# Patient Record
Sex: OTHER/UNKNOWN | Race: WHITE | URBAN - METROPOLITAN AREA
[De-identification: names, ages, dates, MRNs, and addresses within clinical notes are randomized per-mention and may not be internally consistent; named-entity substitution may affect disease eponyms.]

---

## 2018-11-21 ENCOUNTER — OFFICE VISIT (OUTPATIENT)
Dept: INTERNAL MEDICINE | Facility: CLINIC | Age: 23
End: 2018-11-21
Payer: COMMERCIAL

## 2018-11-21 VITALS
TEMPERATURE: 97.8 F | RESPIRATION RATE: 20 BRPM | HEIGHT: 73 IN | BODY MASS INDEX: 29.03 KG/M2 | OXYGEN SATURATION: 98 % | WEIGHT: 219 LBS | HEART RATE: 61 BPM | DIASTOLIC BLOOD PRESSURE: 62 MMHG | SYSTOLIC BLOOD PRESSURE: 110 MMHG

## 2018-11-21 DIAGNOSIS — F41.1 GAD (GENERALIZED ANXIETY DISORDER): ICD-10-CM

## 2018-11-21 DIAGNOSIS — F33.1 MODERATE EPISODE OF RECURRENT MAJOR DEPRESSIVE DISORDER (H): ICD-10-CM

## 2018-11-21 DIAGNOSIS — Z23 NEED FOR PROPHYLACTIC VACCINATION AND INOCULATION AGAINST INFLUENZA: Primary | ICD-10-CM

## 2018-11-21 PROCEDURE — 90686 IIV4 VACC NO PRSV 0.5 ML IM: CPT | Performed by: PHYSICIAN ASSISTANT

## 2018-11-21 PROCEDURE — 90471 IMMUNIZATION ADMIN: CPT | Performed by: PHYSICIAN ASSISTANT

## 2018-11-21 PROCEDURE — 99203 OFFICE O/P NEW LOW 30 MIN: CPT | Mod: 25 | Performed by: PHYSICIAN ASSISTANT

## 2018-11-21 ASSESSMENT — ANXIETY QUESTIONNAIRES
5. BEING SO RESTLESS THAT IT IS HARD TO SIT STILL: SEVERAL DAYS
7. FEELING AFRAID AS IF SOMETHING AWFUL MIGHT HAPPEN: SEVERAL DAYS
2. NOT BEING ABLE TO STOP OR CONTROL WORRYING: NEARLY EVERY DAY
IF YOU CHECKED OFF ANY PROBLEMS ON THIS QUESTIONNAIRE, HOW DIFFICULT HAVE THESE PROBLEMS MADE IT FOR YOU TO DO YOUR WORK, TAKE CARE OF THINGS AT HOME, OR GET ALONG WITH OTHER PEOPLE: SOMEWHAT DIFFICULT
1. FEELING NERVOUS, ANXIOUS, OR ON EDGE: MORE THAN HALF THE DAYS
3. WORRYING TOO MUCH ABOUT DIFFERENT THINGS: MORE THAN HALF THE DAYS
GAD7 TOTAL SCORE: 10
6. BECOMING EASILY ANNOYED OR IRRITABLE: NOT AT ALL

## 2018-11-21 ASSESSMENT — PATIENT HEALTH QUESTIONNAIRE - PHQ9
SUM OF ALL RESPONSES TO PHQ QUESTIONS 1-9: 11
5. POOR APPETITE OR OVEREATING: SEVERAL DAYS

## 2018-11-21 NOTE — PROGRESS NOTES
"  SUBJECTIVE:   Gideon Sue is a 22 year old unknown who presents to clinic today for the following health issues:    Depression/Anxiety - New patient Transfer  Onset: Since 2014  Zoloft 50 mg x 2 weeks (Sima)   Patient was off 4 months then restarted, prior was on 100 mg   Side effects: stomach discomfort     Description:   Depression: YES  Anxiety: YES    Accompanying Signs & Symptoms:  Still participating in activities that you used to enjoy: YES  Fatigue: YES  Irritability: no  Difficulty concentrating: YES- sometimes  Changes in appetite: YES- has gone down recently   Problems with sleep: no - has CPAP  Heart racing/beating fast : no   Thoughts of hurting yourself or others: none    History:   Recent stress: YES- recent move in [place] and new jobs  Prior depression hospitalization: None  Family history of depression: YES- both parents  Family history of anxiety: YES- both parents    Precipitating factors:   Alcohol/drug use: YES- occassionally alcohol       Therapies Tried and outcome: Zoloft (Sertraline) - pt thinks this helps with depression/anxiety. Has not tried anything else in the past.    Patient went to physiatric services for depression and anxiety.     Problem list and histories reviewed & adjusted, as indicated.  Additional history: as documented    Reviewed and updated as needed this visit by clinical staff  Tobacco  Allergies  Meds  Med Hx  Surg Hx  Fam Hx  Soc Hx      Reviewed and updated as needed this visit by Provider  Tobacco  Surg Hx  Fam Hx  Soc Hx        OBJECTIVE:     /62  Pulse 61  Temp 97.8  F (36.6  C) (Oral)  Resp 20  Ht 6' 0.5\" (1.842 m)  Wt 219 lb (99.3 kg)  SpO2 98%  Breastfeeding? No  BMI 29.29 kg/m2  Body mass index is 29.29 kg/(m^2).  GENERAL: healthy, alert and no distress  RESP: lungs clear to auscultation - no rales, rhonchi or wheezes  CV: regular rates and rhythm, normal S1 S2, no S3 or S4 and no murmur, click or rub  PSYCH: mentation appears normal, " affect normal/bright- to minimally flat, speech is clear and thought process is linear     Diagnostic Test Results:  none     ASSESSMENT/PLAN:       1. JAMARI (generalized anxiety disorder)  2. Moderate episode of recurrent major depressive disorder (H)  - only 2 weeks since restarting medication, pt to check in 2 weeks if continued symptoms increase to 100 mg and then follow up in 6 weeks  - MENTAL HEALTH REFERRAL  - Adult; Outpatient Treatment; Individual/Couples/Family/Group Therapy/Health Psychology; G: [facility] [phone]; We will contact you to schedule the appointment or please call with any questions  - sertraline (ZOLOFT) 50 MG tablet; Take 1 tablet (50 mg) by mouth daily  Dispense: 30 tablet; Refill: 2      3. Need for prophylactic vaccination and inoculation against influenza  - Vaccine Administration, Initial [77152]  - FLU VACCINE, SPLIT VIRUS, IM (QUADRIVALENT) [66315]- >3 YRS    Michelle Bravo PA-C  [facility]

## 2018-11-21 NOTE — MR AVS SNAPSHOT
After Visit Summary   11/21/2018    Gideon Sue    MRN: [id number]           Patient Information     Date Of Birth          1995        Visit Information        Provider Department      11/21/2018 8:40 AM Michelle Bonner PA-C [facility]        Today's Diagnoses     JAMARI (generalized anxiety disorder)    -  1       Follow-ups after your visit        Additional Services     MENTAL HEALTH REFERRAL  - Adult; Outpatient Treatment; Individual/Couples/Family/Group Therapy/Health Psychology; G: [facility] [phone]; We will contact you to schedule the appointment or please call with any questions       All scheduling is subject to the client's specific insurance plan & benefits, provider/location availability, and provider clinical specialities.  Please arrive 15 minutes early for your first appointment and bring your completed paperwork.    Please be aware that coverage of these services is subject to the terms and limitations of your health insurance plan.  Call member services at your health plan with any benefit or coverage questions.                            Follow-up notes from your care team     Return in about 6 weeks (around 1/2/2019) for depression follow up .      Who to contact     If you have questions or need follow up information about today's clinic visit or your schedule please contact [facility] directly at [phone].  Normal or non-critical lab and imaging results will be communicated to you by MyChart, letter or phone within 4 business days after the clinic has received the results. If you do not hear from us within 7 days, please contact the clinic through MyChart or phone. If you have a critical or abnormal lab result, we will notify you by phone as soon as possible.  Submit refill requests through [employer] or call your pharmacy and they will forward the refill request to us. Please allow 3  "business days for your refill to be completed.          Additional Information About Your Visit        MyChart Information     Pick1 lets you send messages to your doctor, view your test results, renew your prescriptions, schedule appointments and more. To sign up, go to [email or web address] . Click on \"Log in\" on the left side of the screen, which will take you to the Welcome page. Then click on \"Sign up Now\" on the right side of the page.     You will be asked to enter the access code listed below, as well as some personal information. Please follow the directions to create your username and password.     Your access code is: [email or web address]  Expires: 2019  8:58 AM     Your access code will  in 90 days. If you need help or a new code, please call your [facility] clinic or [phone].        Care EveryWhere ID     This is your Care EveryWhere ID. This could be used by other organizations to access your [facility] medical records  [id number]        Your Vitals Were     Pulse Temperature Respirations Height Pulse Oximetry Breastfeeding?    61 97.8  F (36.6  C) (Oral) 20 6' 0.5\" (1.842 m) 98% No    BMI (Body Mass Index)                   29.29 kg/m2            Blood Pressure from Last 3 Encounters:   18 110/62    Weight from Last 3 Encounters:   18 219 lb (99.3 kg)              We Performed the Following     MENTAL HEALTH REFERRAL  - Adult; Outpatient Treatment; Individual/Couples/Family/Group Therapy/Health Psychology; G: [facility] [phone]; We will contact you to schedule the appointment or please call with any questions          Today's Medication Changes          These changes are accurate as of 18  8:58 AM.  If you have any questions, ask your nurse or doctor.               Start taking these medicines.        Dose/Directions    sertraline 50 MG tablet   Commonly known as:  ZOLOFT   Used for:  JAMARI (generalized anxiety disorder)   Started by:  Piotr Bravo, " Michelle BUCHANAN PA-C        Dose:  50 mg   Take 1 tablet (50 mg) by mouth daily   Quantity:  30 tablet   Refills:  2            Where to get your medicines      These medications were sent to [id number] - [place], [place] - [address] AT [facility] [address], [facility] [address]     Phone:  [phone]     sertraline 50 MG tablet                Primary Care Provider    None Specified       No primary provider on file.        Equal Access to Services     AYDEE PEREYRA : Hadii aad ku hadasho Soomaali, waaxda luqadaha, qaybta kaalmada adeegyada, waxay idiin hayaan maya khdrea potter . So [facility] [phone].    ATENCIÓN: Si habla español, tiene a hendrix disposición servicios gratuitos de asistencia lingüística. Llame al [phone].    We comply with applicable federal civil rights laws and [place] laws. We do not discriminate on the basis of race, color, national origin, age, disability, sex, sexual orientation, or gender identity.            Thank you!     Thank you for choosing [facility]  for your care. Our goal is always to provide you with excellent care. Hearing back from our patients is one way we can continue to improve our services. Please take a few minutes to complete the written survey that you may receive in the mail after your visit with us. Thank you!             Your Updated Medication List - Protect others around you: Learn how to safely use, store and throw away your medicines at www.disposemymeds.org.          This list is accurate as of 11/21/18  8:58 AM.  Always use your most recent med list.                   Brand Name Dispense Instructions for use Diagnosis    sertraline 50 MG tablet    ZOLOFT    30 tablet    Take 1 tablet (50 mg) by mouth daily    JAMARI (generalized anxiety disorder)

## 2018-11-21 NOTE — PROGRESS NOTES

## 2018-11-22 ASSESSMENT — ANXIETY QUESTIONNAIRES: GAD7 TOTAL SCORE: 10

## 2019-02-18 DIAGNOSIS — F41.1 GAD (GENERALIZED ANXIETY DISORDER): ICD-10-CM

## 2019-02-18 NOTE — TELEPHONE ENCOUNTER
"Requested Prescriptions   Pending Prescriptions Disp Refills     sertraline (ZOLOFT) 50 MG tablet 30 tablet 2     Sig: Take 1 tablet (50 mg) by mouth daily    SSRIs Protocol Passed - 2/18/2019  4:01 PM       Passed - Recent (12 mo) or future (30 days) visit within the authorizing provider's specialty    Patient had office visit in the last 12 months or has a visit in the next 30 days with authorizing provider or within the authorizing provider's specialty.  See \"Patient Info\" tab in inbasket, or \"Choose Columns\" in Meds & Orders section of the refill encounter.             Passed - Medication is active on med list       Passed - Patient is age 18 or older        Last Written Prescription Date:  11/21/18  Last Fill Quantity: 30,  # refills: 2   Last office visit: 11/21/2018 with prescribing provider:  11/21/18   Future Office Visit:      "

## 2019-07-03 ENCOUNTER — TELEPHONE (OUTPATIENT)
Dept: INTERNAL MEDICINE | Facility: CLINIC | Age: 24
End: 2019-07-03

## 2019-07-03 DIAGNOSIS — G47.33 OSA ON CPAP: Primary | ICD-10-CM

## 2019-07-03 NOTE — TELEPHONE ENCOUNTER
Reason for Call:  Other call back    Detailed comments: Patient needs to know how to get his CPAP repaired. [place] care [place] & they recommended he call here. Does he need an appointment?    Phone Number Patient can be reached at: Cell number on file:    Telephone Information:   Mobile [phone]       Best Time: anytime    Can we leave a detailed message on this number? YES    Call taken on 7/3/2019 at 3:30 PM by Najma Villalpando

## 2019-07-30 ENCOUNTER — OFFICE VISIT (OUTPATIENT)
Dept: SLEEP MEDICINE | Facility: CLINIC | Age: 24
End: 2019-07-30
Payer: COMMERCIAL

## 2019-07-30 VITALS
SYSTOLIC BLOOD PRESSURE: 121 MMHG | WEIGHT: 225 LBS | HEIGHT: 72 IN | DIASTOLIC BLOOD PRESSURE: 68 MMHG | HEART RATE: 64 BPM | OXYGEN SATURATION: 97 % | RESPIRATION RATE: 16 BRPM | BODY MASS INDEX: 30.48 KG/M2

## 2019-07-30 DIAGNOSIS — G47.33 OSA ON CPAP: Primary | ICD-10-CM

## 2019-07-30 PROCEDURE — 99204 OFFICE O/P NEW MOD 45 MIN: CPT | Performed by: PHYSICIAN ASSISTANT

## 2019-07-30 RX ORDER — CALCIUM CARBONATE 500 MG/1
1 TABLET, CHEWABLE ORAL DAILY
COMMUNITY

## 2019-07-30 ASSESSMENT — MIFFLIN-ST. JEOR: SCORE: 2043.59

## 2019-07-30 NOTE — PROGRESS NOTES
Sleep Consultation:    Date on this visit: 7/30/2019    Gideon Sue is sent by Shy Bryan for a sleep consultation regarding THOMPSON.    Primary Physician: Michelle Bonner     Gideon Sue presents to replace or repair his CPAP. His medical history is significant for depression/anxiety.    His sleep study occurred in [place] in 2/2017.  He thinks his AHI was 12.7/hr.     He is on auto CPAP 9-15 cm. He presents because his motor seems to be failing. There is a loud whirring sound when he inhales and exhales. He needs a loud white noise to fall asleep. Aside from the noise, the machine seems to work ok. He uses and Wisp nasal mask without significant leak. He is not aware of snoring with CPAP. He notices rare dry nose or mouth. He is comfortable with the pressures.     The compliance data shows that he has used the CPAP for 60/60 nights, 92% of nights for >4 hours.  The 95th% pressure is 11.7 cm.  The 95th% leak is 6.4 lpm.  The average nightly usage is 6:54.  The average AHI is 1.6/hr (1.3/hr are centrals).      Gideon goes to sleep at 2:00 AM during the week. Gideon Sue wakes up at 8:00-11:00 AM with an alarm. Gideon Sue falls asleep in 15 minutes.  Gideon denies difficulty falling asleep for the most part.  Gideon Sue wakes up 0-1 times a night for 5 minutes before falling back to sleep.  Gideon wakes up to his dog or other noise.  On weekends, Gideon goes to sleep at 2:00 AM.  Gideon Sue wakes up at 12:00 PM without an alarm. Gideon Sue falls asleep in 15 minutes.  Patient gets an average of 8-10 hours of sleep per night.     Patient does use electronics in bed (15 minutes) and does not watch TV in bed and read in bed. He sometimes gets a racing mind at bedtime.    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  Gideon Sue works day and evening shifts.  He works in [employer]. He also works as a . He lives with his dog.    Gideon does snore every night and snoring is loud without CPAP. Patient does  not have a regular bed partner.  Patient sleeps on Gideon Sue's side. Gideon Sue has occasional morning headaches (if he does not use CPAP) and restless legs (rare, if on feet all day). Gideon has occasional bruxism (mild) and denies any sleep walking, sleep talking, dream enactment, sleep paralysis, cataplexy and hypnogogic/hypnopompic hallucinations.    Gideon has depression and anxiety.  He has chronic mild congestion. He has a mildly deviated septum. He denies heartburn or reflux at night.    Gideon has gained 0-5 pounds in the last couple of years.  Patient describes themself as a night person.  Gideon Sue would prefer to go to sleep at 10:30 PM and wake up at 7:30 AM.  Patient's [place] Sleepiness score 8/24 inconsistent with daytime sleepiness.  His energy is low when he wakes but gets better as the day goes on.    Gideon naps 1-2 times per week for 120-180 minutes, feels unrefreshed after naps. He may take a nap after his day job around 4-5 PM. Gideon Sue takes no inadvertant naps.  Gideon Sue denies dozing while driving.  Patient was counseled on the importance of driving while alert, to pull over if drowsy, or nap before getting into the vehicle if sleepy.  Gideon Sue uses 1-2 cups/day of coffee or soda. Last caffeine intake is usually before 3 PM, maybe a little later if working an evening shift.    Allergies:    No Known Allergies    Medications:    Current Outpatient Medications   Medication Sig Dispense Refill     calcium carbonate (TUMS) 500 MG chewable tablet Take 1 chew tab by mouth daily       sertraline (ZOLOFT) 50 MG tablet Take 1 tablet (50 mg) by mouth daily 30 tablet 8       Problem List:  There are no active problems to display for this patient.       Past Medical/Surgical History:  Past Medical History:   Diagnosis Date     Depressive disorder      THOMPSON (obstructive sleep apnea)      Past Surgical History:   Procedure Laterality Date     wisdom teeth         Social History:  Social  History     Socioeconomic History     Marital status: Single     Spouse name: Not on file     Number of children: Not on file     Years of education: Not on file     Highest education level: Not on file   Occupational History     Not on file   Social Needs     Financial resource strain: Not on file     Food insecurity:     Worry: Not on file     Inability: Not on file     Transportation needs:     Medical: Not on file     Non-medical: Not on file   Tobacco Use     Smoking status: Former Smoker     Types: Cigarettes     Last attempt to quit: 2019     Years since quittin.2     Smokeless tobacco: Never Used   Substance and Sexual Activity     Alcohol use: Yes     Comment: couple times per week, 1-2 drinks     Drug use: No     Sexual activity: Yes     Partners: Female   Lifestyle     Physical activity:     Days per week: Not on file     Minutes per session: Not on file     Stress: Not on file   Relationships     Social connections:     Talks on phone: Not on file     Gets together: Not on file     Attends Alevism service: Not on file     Active member of club or organization: Not on file     Attends meetings of clubs or organizations: Not on file     Relationship status: Not on file     Intimate partner violence:     Fear of current or ex partner: Not on file     Emotionally abused: Not on file     Physically abused: Not on file     Forced sexual activity: Not on file   Other Topics Concern     Parent/sibling w/ CABG, MI or angioplasty before 65F 55M? Not Asked   Social History Narrative     Not on file       Family History:  Family History   Problem Relation Age of Onset     Depression Mother      Anxiety Disorder Mother      Breast Cancer Mother      Depression Father      Anxiety Disorder Father      Unknown/Adopted Father        Review of Systems:  A complete review of systems reviewed by me is negative with the exeption of what has been mentioned in the history of present illness.  CONSTITUTIONAL:  NEGATIVE for weight gain/loss, fever, chills, sweats or night sweats, drug allergies.  EYES: NEGATIVE for changes in vision, blind spots, double vision.  ENT: NEGATIVE for ear pain, sore throat, sinus pain, post-nasal drip, runny nose, bloody nose  ENT:  POSITIVE for  Nasal congestion  CARDIAC: NEGATIVE for fast heartbeats or fluttering in chest, chest pain or pressure, breathlessness when lying flat, swollen legs or swollen feet.  NEUROLOGIC: NEGATIVE headaches, weakness or numbness in the arms or legs.  DERMATOLOGIC: NEGATIVE for rashes, new moles or change in mole(s)  PULMONARY: NEGATIVE SOB at rest, SOB with activity, dry cough, productive cough, coughing up blood, wheezing or whistling when breathing.    GASTROINTESTINAL: NEGATIVE for nausea or vomitting, fat or grease in stools, constipation, abdominal pain, bowel movements black in color or blood noted.  GASTROINTESTINAL:  POSITIVE for  loose or watery stools  GENITOURINARY: NEGATIVE for pain during urination, blood in urine, urinating more frequently than usual, irregular menstrual periods.  MUSCULOSKELETAL: NEGATIVE for muscle pain, bone or joint pain, swollen joints.  ENDOCRINE: NEGATIVE for increased thirst or urination, diabetes.  LYMPHATIC: NEGATIVE for swollen lymph nodes, lumps or bumps in the breasts or nipple discharge.  MENTAL HEALTH: POSITIVE for depression and anxiety    Physical Examination:  Vitals: /68   Pulse 64   Resp 16   Ht 1.829 m (6')   Wt 102.1 kg (225 lb)   SpO2 97%   BMI 30.52 kg/m      Neck Cir (cm): 45 cm    [place] Total Score 7/30/2019   Total score - [place] 8       MARCELLO Total Score: 14 (07/30/19 1404)    GENERAL APPEARANCE: healthy, alert, no distress and cooperative  EYES: Eyes grossly normal to inspection, PERRL, conjunctivae and sclerae normal and lids and lashes normal  HENT: nose and mouth without ulcers or lesions, oral mucous membranes moist and oropharynx clear  NECK: no adenopathy, no asymmetry, masses, or  scars, thyroid normal to palpation and trachea midline and normal to palpation  RESP: lungs clear to auscultation - no rales, rhonchi or wheezes  CV: regular rates and rhythm, normal S1 S2, no S3 or S4, no murmur, click or rub and no irregular beats  LYMPHATICS: no cervical adenopathy  MS: extremities normal- no gross deformities noted  NEURO: Normal strength and tone, mentation intact, speech normal and cranial nerves 2-12 intact  Mallampati Class: II.  Tonsillar Stage: 2  visible at pillars.    Impression/Plan:    (G47.33,  Z99.89) THOMPSON on CPAP  (primary encounter diagnosis)  Comment: Mr. Sue was diagnosed with THOMPSON about 2.5 years ago in [place]. We have requested a copy of his study. He recalls his AHI was 12.7/hr. He has been using CPAP since then and feels better when he uses it. He no longer wakes with headaches. His download shows his apnea is well controlled and he is meeting compliance goals. He presents today because his machine is making a loud whirring sound that makes it hard for him to fall asleep. I turned the machine on and it was louder than it should be. He has no complaints about his sleep otherwise. He gets emails from his previous home care company about every 6 months reminding him to get new supplies.  Plan: Continue auto CPAP 9-15 cm. Bring machine to [facility] for evaluation/repair. He is just past the 2 year warranty.  He has been using  [employer] for CPAP supplies. He was encouraged to let me know if he needs a prescription for new supplies. We also talked about a dental appliance for treatment of his THOMPSON if repairing the CPAP is too expensive.    Addendum: A copy of his sleep study was obtained for the date of 11/9/2016. His AHI was 12.7/hr and RDI 24/hr, O2 ayala 86%. His weight was 210. A copy is being sent to scan.    Gideon Sue will follow up with me in approximately two years.       Polysomnography reviewed.  Obstructive sleep apnea reviewed.  Complications of  untreated sleep apnea were reviewed.  45 minutes was spent during this visit, over 50% in counseling and coordination of care.   Bennett Goltz, PA-C    CC: Shy Bryan

## 2019-08-02 DIAGNOSIS — F33.1 MAJOR DEPRESSIVE DISORDER, RECURRENT EPISODE, MODERATE (H): ICD-10-CM

## 2019-08-02 DIAGNOSIS — G47.33 OBSTRUCTIVE SLEEP APNEA (ADULT) (PEDIATRIC): Primary | ICD-10-CM

## 2019-08-02 NOTE — Clinical Note
Please include pressures for loaner/replacement machine. Once this is received I will contact the patient to bring his machine in for a troubleshoot.Thank you

## 2020-03-11 ENCOUNTER — HEALTH MAINTENANCE LETTER (OUTPATIENT)
Age: 25
End: 2020-03-11

## 2021-01-03 ENCOUNTER — HEALTH MAINTENANCE LETTER (OUTPATIENT)
Age: 26
End: 2021-01-03

## 2021-04-25 ENCOUNTER — HEALTH MAINTENANCE LETTER (OUTPATIENT)
Age: 26
End: 2021-04-25

## 2021-10-10 ENCOUNTER — HEALTH MAINTENANCE LETTER (OUTPATIENT)
Age: 26
End: 2021-10-10

## 2022-05-21 ENCOUNTER — HEALTH MAINTENANCE LETTER (OUTPATIENT)
Age: 27
End: 2022-05-21

## 2022-09-18 ENCOUNTER — HEALTH MAINTENANCE LETTER (OUTPATIENT)
Age: 27
End: 2022-09-18

## 2023-06-04 ENCOUNTER — HEALTH MAINTENANCE LETTER (OUTPATIENT)
Age: 28
End: 2023-06-04